# Patient Record
Sex: FEMALE | Race: WHITE | NOT HISPANIC OR LATINO | Employment: UNEMPLOYED | URBAN - METROPOLITAN AREA
[De-identification: names, ages, dates, MRNs, and addresses within clinical notes are randomized per-mention and may not be internally consistent; named-entity substitution may affect disease eponyms.]

---

## 2019-08-14 ENCOUNTER — TELEPHONE (OUTPATIENT)
Dept: VASCULAR SURGERY | Facility: CLINIC | Age: 34
End: 2019-08-14

## 2019-08-14 DIAGNOSIS — I83.93 VARICOSE VEINS OF BOTH LOWER EXTREMITIES, UNSPECIFIED WHETHER COMPLICATED: Primary | ICD-10-CM

## 2019-08-14 NOTE — TELEPHONE ENCOUNTER
----- Message from Mary Alice Russo sent at 8/13/2019  3:05 PM CDT -----  Regarding: External Patient Referral  Good Afternoon,    Dr. Helen Carpenter would like to refer the following patient to the Vascular Surgery department. The patients diagnosis is Varicose Veins of Lower extremity. I have scanned the patients referral and records into .     Please let me know if I can help schedule in any way.    Thank you,   Mary Alice Garcia

## 2019-11-20 ENCOUNTER — HOSPITAL ENCOUNTER (OUTPATIENT)
Facility: HOSPITAL | Age: 34
Discharge: HOME | End: 2019-11-22
Attending: EMERGENCY MEDICINE | Admitting: INTERNAL MEDICINE
Payer: MEDICAID

## 2019-11-20 DIAGNOSIS — R07.9 CHEST PAIN: ICD-10-CM

## 2019-11-20 DIAGNOSIS — J44.1 COPD WITH ACUTE EXACERBATION: Primary | ICD-10-CM

## 2019-11-20 LAB
ALBUMIN SERPL BCP-MCNC: 4 G/DL (ref 3.5–5.2)
ALP SERPL-CCNC: 72 U/L (ref 55–135)
ALT SERPL W/O P-5'-P-CCNC: 22 U/L (ref 10–44)
ANION GAP SERPL CALC-SCNC: 10 MMOL/L (ref 8–16)
AST SERPL-CCNC: 21 U/L (ref 10–40)
BASOPHILS # BLD AUTO: 0.01 K/UL (ref 0–0.2)
BASOPHILS NFR BLD: 0.2 % (ref 0–1.9)
BILIRUB SERPL-MCNC: 0.5 MG/DL (ref 0.1–1)
BNP SERPL-MCNC: 51 PG/ML (ref 0–99)
BUN SERPL-MCNC: 14 MG/DL (ref 6–20)
CALCIUM SERPL-MCNC: 9 MG/DL (ref 8.7–10.5)
CHLORIDE SERPL-SCNC: 106 MMOL/L (ref 95–110)
CO2 SERPL-SCNC: 25 MMOL/L (ref 23–29)
CREAT SERPL-MCNC: 0.9 MG/DL (ref 0.5–1.4)
DIFFERENTIAL METHOD: ABNORMAL
EOSINOPHIL # BLD AUTO: 0.1 K/UL (ref 0–0.5)
EOSINOPHIL NFR BLD: 2.5 % (ref 0–8)
ERYTHROCYTE [DISTWIDTH] IN BLOOD BY AUTOMATED COUNT: 13 % (ref 11.5–14.5)
EST. GFR  (AFRICAN AMERICAN): >60 ML/MIN/1.73 M^2
EST. GFR  (NON AFRICAN AMERICAN): >60 ML/MIN/1.73 M^2
GLUCOSE SERPL-MCNC: 94 MG/DL (ref 70–110)
HCT VFR BLD AUTO: 42.7 % (ref 37–48.5)
HGB BLD-MCNC: 14 G/DL (ref 12–16)
IMM GRANULOCYTES # BLD AUTO: 0.01 K/UL (ref 0–0.04)
IMM GRANULOCYTES NFR BLD AUTO: 0.2 % (ref 0–0.5)
LYMPHOCYTES # BLD AUTO: 1.1 K/UL (ref 1–4.8)
LYMPHOCYTES NFR BLD: 20.6 % (ref 18–48)
MAGNESIUM SERPL-MCNC: 1.9 MG/DL (ref 1.6–2.6)
MCH RBC QN AUTO: 31 PG (ref 27–31)
MCHC RBC AUTO-ENTMCNC: 32.8 G/DL (ref 32–36)
MCV RBC AUTO: 95 FL (ref 82–98)
MONOCYTES # BLD AUTO: 0.3 K/UL (ref 0.3–1)
MONOCYTES NFR BLD: 6.4 % (ref 4–15)
NEUTROPHILS # BLD AUTO: 3.6 K/UL (ref 1.8–7.7)
NEUTROPHILS NFR BLD: 70.1 % (ref 38–73)
NRBC BLD-RTO: 0 /100 WBC
PLATELET # BLD AUTO: 103 K/UL (ref 150–350)
PMV BLD AUTO: 12.1 FL (ref 9.2–12.9)
POTASSIUM SERPL-SCNC: 3.4 MMOL/L (ref 3.5–5.1)
PROT SERPL-MCNC: 7 G/DL (ref 6–8.4)
RBC # BLD AUTO: 4.52 M/UL (ref 4–5.4)
SODIUM SERPL-SCNC: 141 MMOL/L (ref 136–145)
TROPONIN I SERPL DL<=0.01 NG/ML-MCNC: <0.03 NG/ML (ref 0.02–0.04)
WBC # BLD AUTO: 5.14 K/UL (ref 3.9–12.7)

## 2019-11-20 PROCEDURE — 94640 AIRWAY INHALATION TREATMENT: CPT

## 2019-11-20 PROCEDURE — 99285 EMERGENCY DEPT VISIT HI MDM: CPT | Mod: 25

## 2019-11-20 PROCEDURE — 63600175 PHARM REV CODE 636 W HCPCS: Performed by: EMERGENCY MEDICINE

## 2019-11-20 PROCEDURE — 80053 COMPREHEN METABOLIC PANEL: CPT

## 2019-11-20 PROCEDURE — 85025 COMPLETE CBC W/AUTO DIFF WBC: CPT

## 2019-11-20 PROCEDURE — 83735 ASSAY OF MAGNESIUM: CPT

## 2019-11-20 PROCEDURE — 93005 ELECTROCARDIOGRAM TRACING: CPT

## 2019-11-20 PROCEDURE — 84484 ASSAY OF TROPONIN QUANT: CPT

## 2019-11-20 PROCEDURE — 96374 THER/PROPH/DIAG INJ IV PUSH: CPT

## 2019-11-20 PROCEDURE — 83880 ASSAY OF NATRIURETIC PEPTIDE: CPT

## 2019-11-20 PROCEDURE — 25000242 PHARM REV CODE 250 ALT 637 W/ HCPCS: Performed by: EMERGENCY MEDICINE

## 2019-11-20 RX ORDER — METHYLPREDNISOLONE SOD SUCC 125 MG
125 VIAL (EA) INJECTION
Status: COMPLETED | OUTPATIENT
Start: 2019-11-20 | End: 2019-11-20

## 2019-11-20 RX ORDER — IPRATROPIUM BROMIDE AND ALBUTEROL SULFATE 2.5; .5 MG/3ML; MG/3ML
3 SOLUTION RESPIRATORY (INHALATION)
Status: COMPLETED | OUTPATIENT
Start: 2019-11-20 | End: 2019-11-20

## 2019-11-20 RX ADMIN — IPRATROPIUM BROMIDE AND ALBUTEROL SULFATE 3 ML: .5; 3 SOLUTION RESPIRATORY (INHALATION) at 10:11

## 2019-11-20 RX ADMIN — IPRATROPIUM BROMIDE AND ALBUTEROL SULFATE 3 ML: .5; 3 SOLUTION RESPIRATORY (INHALATION) at 09:11

## 2019-11-20 RX ADMIN — METHYLPREDNISOLONE SODIUM SUCCINATE 125 MG: 125 INJECTION, POWDER, FOR SOLUTION INTRAMUSCULAR; INTRAVENOUS at 10:11

## 2019-11-21 PROBLEM — R07.9 CHEST PAIN: Status: ACTIVE | Noted: 2019-11-21

## 2019-11-21 PROBLEM — D68.51 FACTOR 5 LEIDEN MUTATION, HETEROZYGOUS: Status: ACTIVE | Noted: 2019-11-21

## 2019-11-21 PROBLEM — J44.1 COPD WITH ACUTE EXACERBATION: Status: ACTIVE | Noted: 2019-11-21

## 2019-11-21 PROBLEM — E87.6 HYPOKALEMIA: Status: ACTIVE | Noted: 2019-11-21

## 2019-11-21 LAB
ANION GAP SERPL CALC-SCNC: 8 MMOL/L (ref 8–16)
BASOPHILS # BLD AUTO: 0 K/UL (ref 0–0.2)
BASOPHILS NFR BLD: 0 % (ref 0–1.9)
BUN SERPL-MCNC: 11 MG/DL (ref 6–20)
CALCIUM SERPL-MCNC: 8.6 MG/DL (ref 8.7–10.5)
CHLORIDE SERPL-SCNC: 109 MMOL/L (ref 95–110)
CO2 SERPL-SCNC: 21 MMOL/L (ref 23–29)
CREAT SERPL-MCNC: 0.8 MG/DL (ref 0.5–1.4)
DIFFERENTIAL METHOD: ABNORMAL
EOSINOPHIL # BLD AUTO: 0 K/UL (ref 0–0.5)
EOSINOPHIL NFR BLD: 0.3 % (ref 0–8)
ERYTHROCYTE [DISTWIDTH] IN BLOOD BY AUTOMATED COUNT: 13 % (ref 11.5–14.5)
EST. GFR  (AFRICAN AMERICAN): >60 ML/MIN/1.73 M^2
EST. GFR  (NON AFRICAN AMERICAN): >60 ML/MIN/1.73 M^2
GLUCOSE SERPL-MCNC: 180 MG/DL (ref 70–110)
HCT VFR BLD AUTO: 40.5 % (ref 37–48.5)
HGB BLD-MCNC: 13.3 G/DL (ref 12–16)
IMM GRANULOCYTES # BLD AUTO: 0.01 K/UL (ref 0–0.04)
IMM GRANULOCYTES NFR BLD AUTO: 0.3 % (ref 0–0.5)
INR PPP: 2.8
LYMPHOCYTES # BLD AUTO: 0.4 K/UL (ref 1–4.8)
LYMPHOCYTES NFR BLD: 10.5 % (ref 18–48)
MCH RBC QN AUTO: 30.7 PG (ref 27–31)
MCHC RBC AUTO-ENTMCNC: 32.8 G/DL (ref 32–36)
MCV RBC AUTO: 94 FL (ref 82–98)
MONOCYTES # BLD AUTO: 0.1 K/UL (ref 0.3–1)
MONOCYTES NFR BLD: 1.7 % (ref 4–15)
NEUTROPHILS # BLD AUTO: 3 K/UL (ref 1.8–7.7)
NEUTROPHILS NFR BLD: 87.2 % (ref 38–73)
NRBC BLD-RTO: 0 /100 WBC
PLATELET # BLD AUTO: 93 K/UL (ref 150–350)
PMV BLD AUTO: 12.1 FL (ref 9.2–12.9)
POTASSIUM SERPL-SCNC: 3.7 MMOL/L (ref 3.5–5.1)
PROTHROMBIN TIME: 28.5 SEC (ref 10.6–14.8)
RBC # BLD AUTO: 4.33 M/UL (ref 4–5.4)
SODIUM SERPL-SCNC: 138 MMOL/L (ref 136–145)
TROPONIN I SERPL DL<=0.01 NG/ML-MCNC: <0.03 NG/ML (ref 0.02–0.04)
TROPONIN I SERPL DL<=0.01 NG/ML-MCNC: <0.03 NG/ML (ref 0.02–0.04)
WBC # BLD AUTO: 3.43 K/UL (ref 3.9–12.7)

## 2019-11-21 PROCEDURE — G0378 HOSPITAL OBSERVATION PER HR: HCPCS

## 2019-11-21 PROCEDURE — 25000003 PHARM REV CODE 250: Performed by: NURSE PRACTITIONER

## 2019-11-21 PROCEDURE — 85025 COMPLETE CBC W/AUTO DIFF WBC: CPT

## 2019-11-21 PROCEDURE — 63600175 PHARM REV CODE 636 W HCPCS: Performed by: INTERNAL MEDICINE

## 2019-11-21 PROCEDURE — 94640 AIRWAY INHALATION TREATMENT: CPT

## 2019-11-21 PROCEDURE — 96376 TX/PRO/DX INJ SAME DRUG ADON: CPT

## 2019-11-21 PROCEDURE — 25000242 PHARM REV CODE 250 ALT 637 W/ HCPCS: Performed by: NURSE PRACTITIONER

## 2019-11-21 PROCEDURE — 84484 ASSAY OF TROPONIN QUANT: CPT

## 2019-11-21 PROCEDURE — 36415 COLL VENOUS BLD VENIPUNCTURE: CPT

## 2019-11-21 PROCEDURE — 80048 BASIC METABOLIC PNL TOTAL CA: CPT

## 2019-11-21 PROCEDURE — 85610 PROTHROMBIN TIME: CPT

## 2019-11-21 PROCEDURE — 63600175 PHARM REV CODE 636 W HCPCS: Performed by: EMERGENCY MEDICINE

## 2019-11-21 PROCEDURE — 27000221 HC OXYGEN, UP TO 24 HOURS

## 2019-11-21 PROCEDURE — 25000003 PHARM REV CODE 250: Performed by: INTERNAL MEDICINE

## 2019-11-21 PROCEDURE — 63600175 PHARM REV CODE 636 W HCPCS: Performed by: NURSE PRACTITIONER

## 2019-11-21 PROCEDURE — 94761 N-INVAS EAR/PLS OXIMETRY MLT: CPT

## 2019-11-21 PROCEDURE — 96375 TX/PRO/DX INJ NEW DRUG ADDON: CPT

## 2019-11-21 RX ORDER — ALBUTEROL SULFATE 0.83 MG/ML
2.5 SOLUTION RESPIRATORY (INHALATION) EVERY 6 HOURS PRN
Status: ON HOLD | COMMUNITY
End: 2019-11-22 | Stop reason: HOSPADM

## 2019-11-21 RX ORDER — TIOTROPIUM BROMIDE 18 UG/1
18 CAPSULE ORAL; RESPIRATORY (INHALATION) DAILY
COMMUNITY

## 2019-11-21 RX ORDER — ACETAMINOPHEN 325 MG/1
650 TABLET ORAL EVERY 4 HOURS PRN
Status: DISCONTINUED | OUTPATIENT
Start: 2019-11-21 | End: 2019-11-22 | Stop reason: HOSPADM

## 2019-11-21 RX ORDER — POTASSIUM CHLORIDE 20 MEQ/15ML
40 SOLUTION ORAL
Status: DISCONTINUED | OUTPATIENT
Start: 2019-11-21 | End: 2019-11-22 | Stop reason: HOSPADM

## 2019-11-21 RX ORDER — ALBUTEROL SULFATE 0.83 MG/ML
5 SOLUTION RESPIRATORY (INHALATION)
Status: DISCONTINUED | OUTPATIENT
Start: 2019-11-21 | End: 2019-11-21

## 2019-11-21 RX ORDER — SODIUM,POTASSIUM PHOSPHATES 280-250MG
2 POWDER IN PACKET (EA) ORAL
Status: DISCONTINUED | OUTPATIENT
Start: 2019-11-21 | End: 2019-11-22 | Stop reason: HOSPADM

## 2019-11-21 RX ORDER — WARFARIN 4 MG/1
4 TABLET ORAL DAILY
COMMUNITY

## 2019-11-21 RX ORDER — MORPHINE SULFATE 2 MG/ML
2 INJECTION, SOLUTION INTRAMUSCULAR; INTRAVENOUS ONCE
Status: COMPLETED | OUTPATIENT
Start: 2019-11-21 | End: 2019-11-21

## 2019-11-21 RX ORDER — LANOLIN ALCOHOL/MO/W.PET/CERES
800 CREAM (GRAM) TOPICAL
Status: DISCONTINUED | OUTPATIENT
Start: 2019-11-21 | End: 2019-11-22 | Stop reason: HOSPADM

## 2019-11-21 RX ORDER — VARENICLINE TARTRATE 0.5 MG/1
0.5 TABLET, FILM COATED ORAL 2 TIMES DAILY
COMMUNITY

## 2019-11-21 RX ORDER — WARFARIN 1 MG/1
4 TABLET ORAL DAILY
Status: DISCONTINUED | OUTPATIENT
Start: 2019-11-21 | End: 2019-11-22 | Stop reason: HOSPADM

## 2019-11-21 RX ORDER — TIOTROPIUM BROMIDE 18 UG/1
18 CAPSULE ORAL; RESPIRATORY (INHALATION) DAILY
Status: DISCONTINUED | OUTPATIENT
Start: 2019-11-21 | End: 2019-11-22

## 2019-11-21 RX ORDER — IPRATROPIUM BROMIDE AND ALBUTEROL SULFATE 2.5; .5 MG/3ML; MG/3ML
3 SOLUTION RESPIRATORY (INHALATION) EVERY 6 HOURS
Status: DISCONTINUED | OUTPATIENT
Start: 2019-11-21 | End: 2019-11-22 | Stop reason: HOSPADM

## 2019-11-21 RX ORDER — SODIUM CHLORIDE 0.9 % (FLUSH) 0.9 %
10 SYRINGE (ML) INJECTION
Status: DISCONTINUED | OUTPATIENT
Start: 2019-11-21 | End: 2019-11-22 | Stop reason: HOSPADM

## 2019-11-21 RX ORDER — CLOPIDOGREL BISULFATE 75 MG/1
75 TABLET ORAL DAILY
COMMUNITY

## 2019-11-21 RX ORDER — ATORVASTATIN CALCIUM 40 MG/1
40 TABLET, FILM COATED ORAL DAILY
COMMUNITY

## 2019-11-21 RX ORDER — MAGNESIUM SULFATE 1 G/100ML
1 INJECTION INTRAVENOUS ONCE
Status: COMPLETED | OUTPATIENT
Start: 2019-11-21 | End: 2019-11-21

## 2019-11-21 RX ORDER — ATORVASTATIN CALCIUM 40 MG/1
40 TABLET, FILM COATED ORAL DAILY
Status: DISCONTINUED | OUTPATIENT
Start: 2019-11-21 | End: 2019-11-22 | Stop reason: HOSPADM

## 2019-11-21 RX ORDER — ALBUTEROL SULFATE 0.83 MG/ML
2.5 SOLUTION RESPIRATORY (INHALATION) EVERY 4 HOURS PRN
Status: DISCONTINUED | OUTPATIENT
Start: 2019-11-21 | End: 2019-11-22 | Stop reason: HOSPADM

## 2019-11-21 RX ORDER — BISACODYL 10 MG
10 SUPPOSITORY, RECTAL RECTAL DAILY PRN
Status: DISCONTINUED | OUTPATIENT
Start: 2019-11-21 | End: 2019-11-22 | Stop reason: HOSPADM

## 2019-11-21 RX ORDER — VARENICLINE TARTRATE 0.5 MG/1
0.5 TABLET, FILM COATED ORAL 2 TIMES DAILY
Status: DISCONTINUED | OUTPATIENT
Start: 2019-11-21 | End: 2019-11-22 | Stop reason: HOSPADM

## 2019-11-21 RX ORDER — CLOPIDOGREL BISULFATE 75 MG/1
75 TABLET ORAL DAILY
Status: DISCONTINUED | OUTPATIENT
Start: 2019-11-21 | End: 2019-11-22 | Stop reason: HOSPADM

## 2019-11-21 RX ORDER — GUAIFENESIN 100 MG/5ML
200 SOLUTION ORAL EVERY 4 HOURS PRN
Status: DISCONTINUED | OUTPATIENT
Start: 2019-11-21 | End: 2019-11-22 | Stop reason: HOSPADM

## 2019-11-21 RX ADMIN — AZITHROMYCIN MONOHYDRATE 500 MG: 500 INJECTION, POWDER, LYOPHILIZED, FOR SOLUTION INTRAVENOUS at 01:11

## 2019-11-21 RX ADMIN — VARENICLINE TARTRATE 0.5 MG: 0.5 TABLET, FILM COATED ORAL at 09:11

## 2019-11-21 RX ADMIN — IPRATROPIUM BROMIDE AND ALBUTEROL SULFATE 3 ML: .5; 3 SOLUTION RESPIRATORY (INHALATION) at 01:11

## 2019-11-21 RX ADMIN — METHYLPREDNISOLONE SODIUM SUCCINATE 80 MG: 40 INJECTION, POWDER, FOR SOLUTION INTRAMUSCULAR; INTRAVENOUS at 02:11

## 2019-11-21 RX ADMIN — IPRATROPIUM BROMIDE AND ALBUTEROL SULFATE 3 ML: .5; 3 SOLUTION RESPIRATORY (INHALATION) at 08:11

## 2019-11-21 RX ADMIN — IPRATROPIUM BROMIDE AND ALBUTEROL SULFATE 3 ML: .5; 3 SOLUTION RESPIRATORY (INHALATION) at 11:11

## 2019-11-21 RX ADMIN — VARENICLINE TARTRATE 0.5 MG: 0.5 TABLET, FILM COATED ORAL at 10:11

## 2019-11-21 RX ADMIN — ATORVASTATIN CALCIUM 40 MG: 40 TABLET, FILM COATED ORAL at 10:11

## 2019-11-21 RX ADMIN — MORPHINE SULFATE 2 MG: 2 INJECTION, SOLUTION INTRAMUSCULAR; INTRAVENOUS at 07:11

## 2019-11-21 RX ADMIN — MAGNESIUM SULFATE IN DEXTROSE 1 G: 10 INJECTION, SOLUTION INTRAVENOUS at 01:11

## 2019-11-21 RX ADMIN — METHYLPREDNISOLONE SODIUM SUCCINATE 80 MG: 40 INJECTION, POWDER, FOR SOLUTION INTRAMUSCULAR; INTRAVENOUS at 10:11

## 2019-11-21 RX ADMIN — IPRATROPIUM BROMIDE AND ALBUTEROL SULFATE 3 ML: .5; 3 SOLUTION RESPIRATORY (INHALATION) at 06:11

## 2019-11-21 RX ADMIN — METHYLPREDNISOLONE SODIUM SUCCINATE 80 MG: 40 INJECTION, POWDER, FOR SOLUTION INTRAMUSCULAR; INTRAVENOUS at 05:11

## 2019-11-21 RX ADMIN — CLOPIDOGREL BISULFATE 75 MG: 75 TABLET, FILM COATED ORAL at 10:11

## 2019-11-21 RX ADMIN — WARFARIN SODIUM 4 MG: 1 TABLET ORAL at 05:11

## 2019-11-21 RX ADMIN — GUAIFENESIN 200 MG: 200 SOLUTION ORAL at 03:11

## 2019-11-21 NOTE — ASSESSMENT & PLAN NOTE
Likely due to cough/respiratory problems  Given history of CAD, will continue cardiac monitor for arrhythmia and trend troponin  Continue clopidogrel

## 2019-11-21 NOTE — PLAN OF CARE
11/21/19 0833   Patient Assessment/Suction   Level of Consciousness (AVPU) alert   Respiratory Effort Unlabored;Normal   Expansion/Accessory Muscles/Retractions no use of accessory muscles;no retractions;expansion symmetric   All Lung Fields Breath Sounds wheezes, expiratory   Rhythm/Pattern, Respiratory pattern regular;unlabored;depth regular   Cough Frequency infrequent   Cough Type good   PRE-TX-O2   O2 Device (Oxygen Therapy) nasal cannula   $ Is the patient on Low Flow Oxygen? Yes   Flow (L/min) 3   SpO2 96 %   Pulse Oximetry Type Intermittent   $ Pulse Oximetry - Multiple Charge Pulse Oximetry - Multiple   Pulse 100   Resp 18   Aerosol Therapy   $ Aerosol Therapy Charges Aerosol Treatment   Daily Review of Necessity (SVN) completed   Respiratory Treatment Status (SVN) given   Treatment Route (SVN) mask   Patient Position (SVN) semi-Gonsalez's   Post Treatment Assessment (SVN) increased aeration   Signs of Intolerance (SVN) none   Breath Sounds Post-Respiratory Treatment   Throughout All Fields Post-Treatment All Fields   Throughout All Fields Post-Treatment aeration increased   Post-treatment Heart Rate (beats/min) 101   Post-treatment Resp Rate (breaths/min) 17

## 2019-11-21 NOTE — SUBJECTIVE & OBJECTIVE
Past Medical History:   Diagnosis Date    COPD (chronic obstructive pulmonary disease)     Coronary artery disease        History reviewed. No pertinent surgical history.    Review of patient's allergies indicates:  No Known Allergies    No current facility-administered medications on file prior to encounter.      Current Outpatient Medications on File Prior to Encounter   Medication Sig    albuterol (PROVENTIL) 2.5 mg /3 mL (0.083 %) nebulizer solution Take 2.5 mg by nebulization every 6 (six) hours as needed for Wheezing. Rescue    atorvastatin (LIPITOR) 40 MG tablet Take 40 mg by mouth once daily.    clopidogrel (PLAVIX) 75 mg tablet Take 75 mg by mouth once daily.    tiotropium (SPIRIVA) 18 mcg inhalation capsule Inhale 18 mcg into the lungs once daily. Controller    varenicline (CHANTIX) 0.5 MG Tab Take 0.5 mg by mouth 2 (two) times daily.    warfarin (COUMADIN) 4 MG tablet Take 4 mg by mouth once daily.     Family History     Problem Relation (Age of Onset)    Cancer Mother    No Known Problems Father        Tobacco Use    Smoking status: Current Some Day Smoker   Substance and Sexual Activity    Alcohol use: Not Currently    Drug use: Never    Sexual activity: Not on file     Review of Systems   All other systems reviewed and are negative.    Objective:     Vital Signs (Most Recent):  Temp: 98.1 °F (36.7 °C) (11/20/19 2139)  Pulse: (!) 114 (11/21/19 0000)  Resp: (!) 28 (11/21/19 0000)  BP: 117/66 (11/21/19 0000)  SpO2: (!) 93 % (11/21/19 0000) Vital Signs (24h Range):  Temp:  [98.1 °F (36.7 °C)] 98.1 °F (36.7 °C)  Pulse:  [103-114] 114  Resp:  [16-40] 28  SpO2:  [93 %-99 %] 93 %  BP: (117-154)/(66-93) 117/66     Weight: 83 kg (183 lb)  Body mass index is 30.45 kg/m².    Physical Exam   Constitutional: She is oriented to person, place, and time. She appears well-nourished.   HENT:   Head: Normocephalic.   Eyes: EOM are normal. Right eye exhibits no discharge. Left eye exhibits no discharge.   Neck:  Normal range of motion. Neck supple. No JVD present.   Cardiovascular: Regular rhythm and normal heart sounds.   Tachycardic   Pulmonary/Chest:   Tachypneic, expiratory wheezes bilaterally   Abdominal: Soft. Bowel sounds are normal.   Genitourinary: Vagina normal.   Musculoskeletal: She exhibits no edema, tenderness or deformity.   Neurological: She is alert and oriented to person, place, and time.   Skin: Skin is warm and dry. Capillary refill takes less than 2 seconds.   Vitals reviewed.        CRANIAL NERVES     CN III, IV, VI   Extraocular motions are normal.        Significant Labs:   CBC:   Recent Labs   Lab 11/20/19 2154   WBC 5.14   HGB 14.0   HCT 42.7   *     CMP:   Recent Labs   Lab 11/20/19 2154      K 3.4*      CO2 25   GLU 94   BUN 14   CREATININE 0.9   CALCIUM 9.0   PROT 7.0   ALBUMIN 4.0   BILITOT 0.5   ALKPHOS 72   AST 21   ALT 22   ANIONGAP 10   EGFRNONAA >60.0     Cardiac Markers:   Recent Labs   Lab 11/20/19 2154   BNP 51     Troponin:   Recent Labs   Lab 11/20/19 2154   TROPONINI <0.030     All pertinent labs within the past 24 hours have been reviewed.    Significant Imaging:   CXR, personally reviewed, no obvious infiltrates or overt heart failure  EKG, personally reviewed, sinus tachycardia, rate 105, no ST elevation

## 2019-11-21 NOTE — HPI
The patient is a 34-year-old female with a history of factor 5 Leiden- on anticoagulation therapy with warfarin, CAD (status post PCI of mid LAD in  2016 and mid RCA in 2018), COPD, and hyperlipidemia presented to the  emergency department with shortness of breath. She states that she has been having progressive and severe shortness of breath since yesterday morning despite using her inhalers. Shortness of breath is worsen with activities and frequent nonproductive cough. She denies fever or chills. She also reports mid chest tightness with deep breathing.    She reports that she quit smoking about 5 months ago but had resumed smoking about 1.5 weeks ago. She reports recent weight lost and some chronic ankle/leg swelling.    Per ED physician, she was noted to be tachycardic and tachypneic.  She received 3 rounds of DuoNeb treatments and continuous albuterol nebulizer treatment.  Workup in the ED was unremarkable.  Troponin was negative.  She continues to report shortness of breath.  Oxygen saturation is currently 92-94%.

## 2019-11-21 NOTE — PLAN OF CARE
11/21/19 0519   Patient Assessment/Suction   Level of Consciousness (AVPU) alert   Respiratory Effort Mild;Labored   Expansion/Accessory Muscles/Retractions no use of accessory muscles   All Lung Fields Breath Sounds equal bilaterally;diminished   Rhythm/Pattern, Respiratory shallow;shortness of breath   PRE-TX-O2   O2 Device (Oxygen Therapy) nasal cannula   Flow (L/min) 3   SpO2 96 %   Pulse 88   Resp (!) 24   Aerosol Therapy   $ Aerosol Therapy Charges Aerosol Treatment   Daily Review of Necessity (SVN) completed   Respiratory Treatment Status (SVN) given   Treatment Route (SVN) mask   Patient Position (SVN) sitting on edge of bed   Post Treatment Assessment (SVN) breath sounds unchanged   Signs of Intolerance (SVN) none   Breath Sounds Post-Respiratory Treatment   Post-treatment Heart Rate (beats/min) 101   Post-treatment Resp Rate (breaths/min) 20   Equipment Change   $ RT Equipment Treatment nebuilzer;Aerosol treatment mask

## 2019-11-21 NOTE — ED NOTES
No distress noted / hospitalist at side / admission in progress / pt aware and voices understanding

## 2019-11-21 NOTE — ED NOTES
Rounding on the patient has been done. she has been updated on the plan of care and her current status. Comfort positioning and restroom needs were addressed. Necessary items were placed with in her reach and she was advised when a reassessment would take place. The call bell remains at the bedside for any additional patient needs. The patient is resting comfortably on the stretcher, respirations are even and unlabored, skin warm and dry. Will continue to monitor.

## 2019-11-21 NOTE — H&P
[facility] Medicine  History & Physical  Date of service: 11/21/2019  At 0045    Patient Name: Christine Segovia  MRN: [id number]  Admission Date: 11/20/2019  Attending Physician: Clarice Hodgson MD   Primary Care Provider: Helen Angel MD         Patient information was obtained from patient and ER records.     Subjective:     Principal Problem:COPD with acute exacerbation    Chief Complaint:   Chief Complaint   Patient presents with    Shortness of Breath        HPI: The patient is a 34-year-old female with a history of factor 5 Leiden- on anticoagulation therapy with warfarin, CAD (status post PCI of mid LAD in  2016 and mid RCA in 2018), COPD, and hyperlipidemia presented to the  emergency department with shortness of breath. She states that she has been having progressive and severe shortness of breath since yesterday morning despite using her inhalers. Shortness of breath is worsen with activities and frequent nonproductive cough. She denies fever or chills. She also reports mid chest tightness with deep breathing.    She reports that she quit smoking about 5 months ago but had resumed smoking about 1.5 weeks ago. She reports recent weight lost and some chronic ankle/leg swelling.    Per ED physician, she was noted to be tachycardic and tachypneic.  She received 3 rounds of DuoNeb treatments and continuous albuterol nebulizer treatment.  Workup in the ED was unremarkable.  Troponin was negative.  She continues to report shortness of breath.  Oxygen saturation is currently 92-94%.          Past Medical History:   Diagnosis Date    COPD (chronic obstructive pulmonary disease)     Coronary artery disease        History reviewed. No pertinent surgical history.    Review of patient's allergies indicates:  No Known Allergies    No current facility-administered medications on file prior to encounter.      Current Outpatient Medications on File Prior to Encounter   Medication Sig     albuterol (PROVENTIL) 2.5 mg /3 mL (0.083 %) nebulizer solution Take 2.5 mg by nebulization every 6 (six) hours as needed for Wheezing. Rescue    atorvastatin (LIPITOR) 40 MG tablet Take 40 mg by mouth once daily.    clopidogrel (PLAVIX) 75 mg tablet Take 75 mg by mouth once daily.    tiotropium (SPIRIVA) 18 mcg inhalation capsule Inhale 18 mcg into the lungs once daily. Controller    varenicline (CHANTIX) 0.5 MG Tab Take 0.5 mg by mouth 2 (two) times daily.    warfarin (COUMADIN) 4 MG tablet Take 4 mg by mouth once daily.     Family History     Problem Relation (Age of Onset)    Cancer Mother    No Known Problems Father        Tobacco Use    Smoking status: Current Some Day Smoker   Substance and Sexual Activity    Alcohol use: Not Currently    Drug use: Never    Sexual activity: Not on file     Review of Systems   All other systems reviewed and are negative.    Objective:     Vital Signs (Most Recent):  Temp: 98.1 °F (36.7 °C) (11/20/19 2139)  Pulse: (!) 114 (11/21/19 0000)  Resp: (!) 28 (11/21/19 0000)  BP: 117/66 (11/21/19 0000)  SpO2: (!) 93 % (11/21/19 0000) Vital Signs (24h Range):  Temp:  [98.1 °F (36.7 °C)] 98.1 °F (36.7 °C)  Pulse:  [103-114] 114  Resp:  [16-40] 28  SpO2:  [93 %-99 %] 93 %  BP: (117-154)/(66-93) 117/66     Weight: 83 kg (183 lb)  Body mass index is 30.45 kg/m².    Physical Exam   Constitutional: She is oriented to person, place, and time. She appears well-nourished.   HENT:   Head: Normocephalic.   Eyes: EOM are normal. Right eye exhibits no discharge. Left eye exhibits no discharge.   Neck: Normal range of motion. Neck supple. No JVD present.   Cardiovascular: Regular rhythm and normal heart sounds.   Tachycardic   Pulmonary/Chest:   Tachypneic, expiratory wheezes bilaterally   Abdominal: Soft. Bowel sounds are normal.   Genitourinary: Vagina normal.   Musculoskeletal: She exhibits no edema, tenderness or deformity.   Neurological: She is alert and oriented to person, place, and  time.   Skin: Skin is warm and dry. Capillary refill takes less than 2 seconds.   Vitals reviewed.        CRANIAL NERVES     CN III, IV, VI   Extraocular motions are normal.        Significant Labs:   CBC:   Recent Labs   Lab 11/20/19 2154   WBC 5.14   HGB 14.0   HCT 42.7   *     CMP:   Recent Labs   Lab 11/20/19 2154      K 3.4*      CO2 25   GLU 94   BUN 14   CREATININE 0.9   CALCIUM 9.0   PROT 7.0   ALBUMIN 4.0   BILITOT 0.5   ALKPHOS 72   AST 21   ALT 22   ANIONGAP 10   EGFRNONAA >60.0     Cardiac Markers:   Recent Labs   Lab 11/20/19 2154   BNP 51     Troponin:   Recent Labs   Lab 11/20/19 2154   TROPONINI <0.030     All pertinent labs within the past 24 hours have been reviewed.    Significant Imaging:   CXR, personally reviewed, no obvious infiltrates or overt heart failure  EKG, personally reviewed, sinus tachycardia, rate 105, no ST elevation    Assessment/Plan:     * COPD with acute exacerbation  Monitor pulse oximetry, O2 as needed  Inhalers/nebulizers  Tobacco cessation advised  ABX with azithromycin  IV Steroids        Chest pain  Likely due to cough/respiratory problems  Given history of CAD, will continue cardiac monitor for arrhythmia and trend troponin  Continue clopidogrel    Factor 5 Leiden mutation, heterozygous  Continue anticoagulation therapy  Therapeutic INR  Monitor PT/INR      Hypokalemia  Replete  Monitor      VTE Risk Mitigation (From admission, onward)         Ordered     warfarin (COUMADIN) tablet 4 mg  Daily      11/21/19 0101                   MARTIR Orosco  Department of Hospital Medicine   [facility]

## 2019-11-21 NOTE — ASSESSMENT & PLAN NOTE
Monitor pulse oximetry, O2 as needed  Inhalers/nebulizers  Tobacco cessation advised  ABX with azithromycin  IV Steroids

## 2019-11-22 VITALS
SYSTOLIC BLOOD PRESSURE: 119 MMHG | HEIGHT: 65 IN | HEART RATE: 100 BPM | WEIGHT: 184.44 LBS | OXYGEN SATURATION: 96 % | TEMPERATURE: 98 F | RESPIRATION RATE: 18 BRPM | DIASTOLIC BLOOD PRESSURE: 68 MMHG | BODY MASS INDEX: 30.73 KG/M2

## 2019-11-22 LAB
ANION GAP SERPL CALC-SCNC: 8 MMOL/L (ref 8–16)
BUN SERPL-MCNC: 14 MG/DL (ref 6–20)
CALCIUM SERPL-MCNC: 9.2 MG/DL (ref 8.7–10.5)
CHLORIDE SERPL-SCNC: 108 MMOL/L (ref 95–110)
CO2 SERPL-SCNC: 25 MMOL/L (ref 23–29)
CREAT SERPL-MCNC: 0.9 MG/DL (ref 0.5–1.4)
EST. GFR  (AFRICAN AMERICAN): >60 ML/MIN/1.73 M^2
EST. GFR  (NON AFRICAN AMERICAN): >60 ML/MIN/1.73 M^2
GLUCOSE SERPL-MCNC: 189 MG/DL (ref 70–110)
INR PPP: 3.6
POTASSIUM SERPL-SCNC: 4.1 MMOL/L (ref 3.5–5.1)
PROTHROMBIN TIME: 34.5 SEC (ref 10.6–14.8)
SODIUM SERPL-SCNC: 141 MMOL/L (ref 136–145)

## 2019-11-22 PROCEDURE — 80048 BASIC METABOLIC PNL TOTAL CA: CPT

## 2019-11-22 PROCEDURE — 94761 N-INVAS EAR/PLS OXIMETRY MLT: CPT

## 2019-11-22 PROCEDURE — G0378 HOSPITAL OBSERVATION PER HR: HCPCS

## 2019-11-22 PROCEDURE — 63600175 PHARM REV CODE 636 W HCPCS: Performed by: NURSE PRACTITIONER

## 2019-11-22 PROCEDURE — 96376 TX/PRO/DX INJ SAME DRUG ADON: CPT

## 2019-11-22 PROCEDURE — 25000003 PHARM REV CODE 250: Performed by: NURSE PRACTITIONER

## 2019-11-22 PROCEDURE — 99900035 HC TECH TIME PER 15 MIN (STAT)

## 2019-11-22 PROCEDURE — 94640 AIRWAY INHALATION TREATMENT: CPT

## 2019-11-22 PROCEDURE — 25000242 PHARM REV CODE 250 ALT 637 W/ HCPCS: Performed by: NURSE PRACTITIONER

## 2019-11-22 PROCEDURE — 36415 COLL VENOUS BLD VENIPUNCTURE: CPT

## 2019-11-22 PROCEDURE — 27000221 HC OXYGEN, UP TO 24 HOURS

## 2019-11-22 PROCEDURE — 85610 PROTHROMBIN TIME: CPT

## 2019-11-22 RX ORDER — GUAIFENESIN 100 MG/5ML
200 SOLUTION ORAL EVERY 4 HOURS PRN
Qty: 100 ML | Refills: 0 | Status: SHIPPED | OUTPATIENT
Start: 2019-11-22 | End: 2019-12-02

## 2019-11-22 RX ORDER — ALBUTEROL SULFATE 0.63 MG/3ML
0.63 SOLUTION RESPIRATORY (INHALATION) EVERY 6 HOURS PRN
Qty: 1 BOX | Refills: 0 | Status: SHIPPED | OUTPATIENT
Start: 2019-11-22 | End: 2020-11-21

## 2019-11-22 RX ORDER — PREDNISONE 10 MG/1
TABLET ORAL
Qty: 63 TABLET | Refills: 0 | Status: SHIPPED | OUTPATIENT
Start: 2019-11-22 | End: 2019-12-07

## 2019-11-22 RX ADMIN — VARENICLINE TARTRATE 0.5 MG: 0.5 TABLET, FILM COATED ORAL at 08:11

## 2019-11-22 RX ADMIN — ALBUTEROL SULFATE 2.5 MG: 2.5 SOLUTION RESPIRATORY (INHALATION) at 04:11

## 2019-11-22 RX ADMIN — IPRATROPIUM BROMIDE AND ALBUTEROL SULFATE 3 ML: .5; 3 SOLUTION RESPIRATORY (INHALATION) at 08:11

## 2019-11-22 RX ADMIN — AZITHROMYCIN MONOHYDRATE 500 MG: 500 INJECTION, POWDER, LYOPHILIZED, FOR SOLUTION INTRAVENOUS at 02:11

## 2019-11-22 RX ADMIN — METHYLPREDNISOLONE SODIUM SUCCINATE 80 MG: 40 INJECTION, POWDER, FOR SOLUTION INTRAMUSCULAR; INTRAVENOUS at 06:11

## 2019-11-22 NOTE — DISCHARGE SUMMARY
formerly [facility] Medicine  Discharge Summary      Patient Name: Christine Segovia  MRN: [id number]  Admission Date: 11/20/2019  Hospital Length of Stay: 1 days  Discharge Date and Time:  11/22/2019 10:26 AM  Attending Physician: Clarice Hodgson MD   Discharging Provider: Colten Laurent MD  Primary Care Provider: Helen Angel MD      HPI:   The patient is a 34-year-old female with a history of factor 5 Leiden- on anticoagulation therapy with warfarin, CAD (status post PCI of mid LAD in  2016 and mid RCA in 2018), COPD, and hyperlipidemia presented to the  emergency department with shortness of breath. She states that she has been having progressive and severe shortness of breath since yesterday morning despite using her inhalers. Shortness of breath is worsen with activities and frequent nonproductive cough. She denies fever or chills. She also reports mid chest tightness with deep breathing.    She reports that she quit smoking about 5 months ago but had resumed smoking about 1.5 weeks ago. She reports recent weight lost and some chronic ankle/leg swelling.    Per ED physician, she was noted to be tachycardic and tachypneic.  She received 3 rounds of DuoNeb treatments and continuous albuterol nebulizer treatment.  Workup in the ED was unremarkable.  Troponin was negative.  She continues to report shortness of breath.  Oxygen saturation is currently 92-94%.              Hospital Course:   Patient feels improved on day of discharge and wants to go home because her  is unable to watch her two young children who are 10 and 9. Patient is prescribed a tapering dose of steroids (prolonged taper of 12 days) no antibiotics necessary. Tiotropium daily is continued from home medication and albuterol inhaler as needed.   Vitals:    11/22/19 0823   BP:    Pulse: 100   Resp: 18   Temp:      GEN: ao nad  HEENT: ncat  LUNGS: clear, no wheezing, diminished at bases and peripherally  CAR: nl s1s2  ABD:  soft ntnd  EXT: rom grossly intact  SKIN: warm+dry  NEURO: cn 2-12 grossly intact    Consults:   Consults (From admission, onward)        Status Ordering Provider     Inpatient consult to Hospitalist  Once     Provider:  MARTIR Nuñez ELIZABETH D.          No new Assessment & Plan notes have been filed under this hospital service since the last note was generated.  Service: Hospital Medicine    Final Active Diagnoses:    Diagnosis Date Noted POA    PRINCIPAL PROBLEM:  COPD with acute exacerbation [J44.1] 11/21/2019 Yes    Chest pain [R07.9] 11/21/2019 Yes    Factor 5 Leiden mutation, heterozygous [D68.51] 11/21/2019 Yes    Hypokalemia [E87.6] 11/21/2019 Yes      Problems Resolved During this Admission:       Discharged Condition: fair    Disposition: Home or Self Care    Follow Up:  Follow-up Information     Schedule an appointment as soon as possible for a visit with Helen Angel MD.    Specialty:  Internal Medicine  Why:  As needed  Contact information:  [address]  [address] [place] [address]  [phone]                 Patient Instructions:      Diet Cardiac     Notify your health care provider if you experience any of the following:  temperature >100.4     Notify your health care provider if you experience any of the following:  difficulty breathing or increased cough     Notify your health care provider if you experience any of the following:  severe persistent headache     Notify your health care provider if you experience any of the following:  persistent dizziness, light-headedness, or visual disturbances     Notify your health care provider if you experience any of the following:  increased confusion or weakness     Activity as tolerated       Significant Diagnostic Studies: Labs: All labs within the past 24 hours have been reviewed    Pending Diagnostic Studies:     None         Medications:  Reconciled Home Medications:      Medication List       START taking these medications    albuterol 0.63 mg/3 mL Nebu  Commonly known as:  ACCUNEB  Take 3 mLs (0.63 mg total) by nebulization every 6 (six) hours as needed. Rescue  Replaces:  albuterol 2.5 mg /3 mL (0.083 %) nebulizer solution     guaifenesin 100 mg/5 ml 100 mg/5 mL syrup  Commonly known as:  ROBITUSSIN  Take 10 mLs (200 mg total) by mouth every 4 (four) hours as needed for Congestion.     predniSONE 10 mg tablet pack  Commonly known as:  DELTASONE  Take 4 tablets (40 mg total) by mouth 2 (two) times daily for 3 days, THEN 3 tablets (30 mg total) 2 (two) times daily for 3 days, THEN 2 tablets (20 mg total) 2 (two) times daily for 3 days, THEN 1 tablet (10 mg total) 2 (two) times daily for 3 days, THEN 1 tablet (10 mg total) once daily for 3 days.  Start taking on:  November 22, 2019        CONTINUE taking these medications    atorvastatin 40 MG tablet  Commonly known as:  LIPITOR  Take 40 mg by mouth once daily.     clopidogrel 75 mg tablet  Commonly known as:  PLAVIX  Take 75 mg by mouth once daily.     tiotropium 18 mcg inhalation capsule  Commonly known as:  SPIRIVA  Inhale 18 mcg into the lungs once daily. Controller     varenicline 0.5 MG Tab  Commonly known as:  CHANTIX  Take 0.5 mg by mouth 2 (two) times daily.     warfarin 4 MG tablet  Commonly known as:  COUMADIN  Take 4 mg by mouth once daily.        STOP taking these medications    albuterol 2.5 mg /3 mL (0.083 %) nebulizer solution  Commonly known as:  PROVENTIL  Replaced by:  albuterol 0.63 mg/3 mL Nebu            Indwelling Lines/Drains at time of discharge:   Lines/Drains/Airways     None                 Time spent on the discharge of patient: 16 minutes  Patient was seen and examined on the date of discharge and determined to be suitable for discharge.         Colten Laurent MD  Department of Hospital Medicine  Formerly [facility] later [facility]

## 2019-11-22 NOTE — PROGRESS NOTES
Cardiac Rehab     Christine Segovia   [id number]   11/22/2019         Cardiac Rehab Phase Taught: Phase 1    Teaching Method: Verbal    Handouts: Smoking Cessation    Educational Videos: None    Understanding:  Knowledge indicated by feedback and Verbalize understanding    Comments: Education on Copd and smoking cessation provided. Questions answered.    Total Time Spent: 15 mins            Jennyfer Lynch RN

## 2019-11-22 NOTE — PLAN OF CARE
11/22/19 0823   Patient Assessment/Suction   Level of Consciousness (AVPU) alert   Respiratory Effort Normal;Unlabored   Expansion/Accessory Muscles/Retractions no retractions;no use of accessory muscles   All Lung Fields Breath Sounds coarse   PRE-TX-O2   O2 Device (Oxygen Therapy) room air   $ Is the patient on Low Flow Oxygen? Yes  (O2 on SB)   SpO2 96 %   Pulse Oximetry Type Intermittent   $ Pulse Oximetry - Multiple Charge Pulse Oximetry - Multiple   Pulse 100   Resp 18   Aerosol Therapy   $ Aerosol Therapy Charges Aerosol Treatment   Daily Review of Necessity (SVN) completed   Respiratory Treatment Status (SVN) given   Treatment Route (SVN) mask   Patient Position (SVN) HOB elevated   Post Treatment Assessment (SVN) increased aeration;patient reports breathing improved   Signs of Intolerance (SVN) none   Inhaler   $ Inhaler Charges Refused   Breath Sounds Post-Respiratory Treatment   Post-treatment Heart Rate (beats/min) 90   Post-treatment Resp Rate (breaths/min) 18

## 2019-11-22 NOTE — PLAN OF CARE
11/21/19 1856   Patient Assessment/Suction   Level of Consciousness (AVPU) alert   Respiratory Effort Normal;Unlabored   Expansion/Accessory Muscles/Retractions no use of accessory muscles   All Lung Fields Breath Sounds wheezes, expiratory   PRE-TX-O2   O2 Device (Oxygen Therapy) nasal cannula   Flow (L/min) 2   SpO2 96 %   Pulse 107   Resp 18   Aerosol Therapy   $ Aerosol Therapy Charges Aerosol Treatment   Respiratory Treatment Status (SVN) given   Treatment Route (SVN) mask   Patient Position (SVN) semi-Gonsalez's   Post Treatment Assessment (SVN) breath sounds improved   Signs of Intolerance (SVN) none   Breath Sounds Post-Respiratory Treatment   Throughout All Fields Post-Treatment All Fields   Throughout All Fields Post-Treatment aeration increased   Post-treatment Heart Rate (beats/min) 103   Post-treatment Resp Rate (breaths/min) 16

## 2019-11-22 NOTE — PLAN OF CARE
11/22/19 1055   Final Note   Assessment Type Final Discharge Note   Anticipated Discharge Disposition Home

## 2021-02-15 NOTE — ED PROVIDER NOTES
Encounter Date: 11/20/2019       History     Chief Complaint   Patient presents with    Shortness of Breath     34-year-old female with a history of factor 5 Leiden, CAD (status post stenting in 2016, 2018), COPD presents emergency department with shortness of breath.  The patient states that she had previously quit smoking for 5 months.  She began smoking almost a week ago.  She states that over the past day she has had worsening shortness of breath with a nonproductive cough.  She has attempted to use her inhaler multiple times today with progressively worsening shortness of breath.  The patient denies any fevers or chills. She states that she has chest tightness that is consistent with her prior episodes of COPD exacerbations.  She denies any marichuy chest pain.  She has never been intubated.        Review of patient's allergies indicates:  No Known Allergies  No past medical history on file.  No past surgical history on file.  No family history on file.  Social History     Tobacco Use    Smoking status: Not on file   Substance Use Topics    Alcohol use: Not on file    Drug use: Not on file     Review of Systems   Constitutional: Negative for chills, diaphoresis, fatigue and fever.   HENT: Negative for congestion.    Eyes: Negative for visual disturbance.   Respiratory: Positive for cough, shortness of breath and wheezing. Negative for stridor.    Cardiovascular: Negative for chest pain, palpitations and leg swelling.   Gastrointestinal: Negative for abdominal distention, diarrhea, nausea and vomiting.   Genitourinary: Negative for dysuria, frequency, hematuria and urgency.   Musculoskeletal: Negative for back pain.   Skin: Negative for pallor.   Neurological: Negative for weakness, light-headedness, numbness and headaches.   Psychiatric/Behavioral: Negative for confusion.   All other systems reviewed and are negative.      Physical Exam     Initial Vitals [11/20/19 2139]   BP Pulse Resp Temp SpO2   138/80 103  Maureen Nguyen RN  Outcome: Not Progressing (!) 40 98.1 °F (36.7 °C) (!) 94 %      MAP       --         Physical Exam    Nursing note and vitals reviewed.  Constitutional: She appears well-developed and well-nourished. She appears distressed.   HENT:   Head: Normocephalic and atraumatic.   Eyes: EOM are normal. Pupils are equal, round, and reactive to light.   Neck: Normal range of motion. Neck supple.   Cardiovascular: Normal rate, regular rhythm, normal heart sounds and intact distal pulses.   Pulmonary/Chest: She is in respiratory distress. She has wheezes (Severe inspiratory and expiratory wheezes with decreased air entry). She has no rhonchi. She has no rales.   Increased work of breathing with accessory muscle use   Abdominal: Soft. Bowel sounds are normal. She exhibits no distension. There is no tenderness. There is no rebound.   Musculoskeletal: Normal range of motion.   Neurological: She is alert and oriented to person, place, and time. She has normal strength. No sensory deficit. GCS score is 15. GCS eye subscore is 4. GCS verbal subscore is 5. GCS motor subscore is 6.   Skin: Skin is warm and dry. Capillary refill takes less than 2 seconds.   Psychiatric: Thought content normal.         ED Course   Procedures  Labs Reviewed   CBC W/ AUTO DIFFERENTIAL - Abnormal; Notable for the following components:       Result Value    Platelets 103 (*)     All other components within normal limits   COMPREHENSIVE METABOLIC PANEL - Abnormal; Notable for the following components:    Potassium 3.4 (*)     All other components within normal limits   TROPONIN I   B-TYPE NATRIURETIC PEPTIDE   MAGNESIUM     EKG Readings: (Independently Interpreted)   Initial Reading: No STEMI.   Sinus tachycardia 102 beats per minute with no ST elevations or depressions, normal intervals       Imaging Results          X-Ray Chest AP Portable (In process)               X-Rays:   Independently Interpreted Readings:   Chest X-Ray: No acute abnormalities.     Medical Decision Making:   ED  Management:  34-year-old female presents the emergency department with shortness of breath.  Differential includes COPD exacerbation, pneumothorax, pneumonia, PE, pericardial effusion, ACS, CHF.  The patient was treated with aggressive nebulizer treatments, Solu-Medrol and magnesium.  She remains with tachypnea and expiratory wheezes. She is oxygen saturations in the low 90s.  Her labs are grossly unremarkable.  EKG is nonischemic and chest x-ray without focal consolidation.  The patient will be admitted for serial nebulizer treatments.  She has also been covered with azithromycin given her worsening cough.    Corazon Delatorre MD  Emergency Medicine  11/21/2019 12:09 AM                                   Clinical Impression:       ICD-10-CM ICD-9-CM   1. Chest pain R07.9 786.50                             Corazon Delatorre MD  11/21/19 0009

## 2021-12-20 DIAGNOSIS — R07.81 PLEURODYNIA: Primary | ICD-10-CM

## 2022-04-05 ENCOUNTER — HOSPITAL ENCOUNTER (OUTPATIENT)
Dept: RADIOLOGY | Facility: HOSPITAL | Age: 37
Discharge: HOME | End: 2022-04-05
Attending: INTERNAL MEDICINE
Payer: MEDICAID

## 2022-04-05 DIAGNOSIS — R07.81 PLEURODYNIA: ICD-10-CM

## 2022-04-05 PROCEDURE — 71046 X-RAY EXAM CHEST 2 VIEWS: CPT | Mod: TC,PO

## 2022-04-05 PROCEDURE — 71110 X-RAY EXAM RIBS BIL 3 VIEWS: CPT | Mod: TC,PO

## 2022-06-01 ENCOUNTER — TELEPHONE (OUTPATIENT)
Dept: PULMONOLOGY | Facility: CLINIC | Age: 37
End: 2022-06-01
Payer: MEDICAID

## 2022-06-01 NOTE — TELEPHONE ENCOUNTER
Sent message to sender. Not taking any medicaid patients at this time.   ----- Message from Mary Alice Russo sent at 6/1/2022  8:08 AM CDT -----  Regarding: FW: Pulmonary Referral  Good Morning,    I was checking the status of getting this patient scheduled.    Thank you,  Mary Alice    ----- Message -----  From: Mary Alice Russo  Sent: 5/13/2022  10:24 AM CDT  To: [facility] Pulmonary Clinical Support  Subject: Pulmonary Referral                               Good Morning,    Dr. Helen Angel would like to refer the following patient to the Pulmonary department. The patients diagnosis is moderate persistent asthma. I have scanned the patients referral and records into .   .   Please let me know if I can help schedule in any way.    Thank you,   Mary Alice Garcia

## 2022-07-19 DIAGNOSIS — E88.01 ALPHA-1-ANTITRYPSIN DEFICIENCY: Primary | ICD-10-CM

## 2022-08-23 ENCOUNTER — HOSPITAL ENCOUNTER (OUTPATIENT)
Dept: PULMONOLOGY | Facility: HOSPITAL | Age: 37
Discharge: HOME | End: 2022-08-23
Attending: INTERNAL MEDICINE
Payer: MEDICAID

## 2022-08-23 DIAGNOSIS — E88.01 ALPHA-1-ANTITRYPSIN DEFICIENCY: ICD-10-CM

## 2022-08-23 PROCEDURE — 94060 EVALUATION OF WHEEZING: CPT

## 2022-08-23 PROCEDURE — 94727 GAS DIL/WSHOT DETER LNG VOL: CPT

## 2022-08-23 PROCEDURE — 94010 BREATHING CAPACITY TEST: CPT

## 2022-08-23 PROCEDURE — 94729 DIFFUSING CAPACITY: CPT

## 2022-12-06 DIAGNOSIS — M25.562 PAIN IN LEFT KNEE: Primary | ICD-10-CM

## 2022-12-12 ENCOUNTER — CLINICAL SUPPORT (OUTPATIENT)
Dept: REHABILITATION | Facility: HOSPITAL | Age: 37
End: 2022-12-12
Payer: MEDICAID

## 2022-12-12 DIAGNOSIS — M62.81 MUSCLE WEAKNESS: ICD-10-CM

## 2022-12-12 DIAGNOSIS — M25.562 CHRONIC PAIN OF LEFT KNEE: Primary | ICD-10-CM

## 2022-12-12 DIAGNOSIS — M25.60 DECREASED RANGE OF MOTION: ICD-10-CM

## 2022-12-12 DIAGNOSIS — G89.29 CHRONIC PAIN OF LEFT KNEE: Primary | ICD-10-CM

## 2022-12-12 PROCEDURE — 97162 PT EVAL MOD COMPLEX 30 MIN: CPT | Mod: PN | Performed by: PHYSICAL THERAPIST

## 2022-12-12 PROCEDURE — 97110 THERAPEUTIC EXERCISES: CPT | Mod: PN | Performed by: PHYSICAL THERAPIST

## 2022-12-12 NOTE — PLAN OF CARE
[facility]  Physical Therapy Initial Evaluation    Name: Christine Segovia  Clinic Number: [id number]    Therapy Diagnosis:   Encounter Diagnoses   Name Primary?    Chronic pain of left knee Yes    Muscle weakness     Decreased range of motion      Physician: Helen Carpenter MD    Physician Orders: PT Eval and Treat   Medical Diagnosis from Referral: M25.562 (ICD-10-CM) - Pain in left knee  Evaluation Date: 12/12/2022  Authorization Period Expiration: 12-31-22  Plan of Care Expiration: 2-6-23  Progress Note Due: 1-12-23  Visit # / Visits authorized: 1/ 1  FOTO: Issued Visit #: 1    MD Follow up appointment: none scheduled    Precautions: hx of CAD and COPD    Time In: 12:05  Time Out: 12:45  Total Billable Time: 40 minutes    Subjective   Date of onset: 4 weeks ago  History of current condition - Christine reports: was on her feet with boots for a long time at [facility] and began with pain in L knee. Pt states she had difficulty bending the L knee.  Pt states no swelling.  Pt went to [facility] and knee is improving somewhat Pt states knee was improving when went to [facility] so no x-rays and no med     Medical History:   Pt also has Factor 5 deficiency and needs to wear compression stockings   Past Medical History:   Diagnosis Date    COPD (chronic obstructive pulmonary disease)     Coronary artery disease        Surgical History:  2 C sections and 3 D and C .  Pt has 2 stents for heart   Christine Segovia  has no past surgical history on file.    Medications:   Christine has a current medication list which includes the following prescription(s): albuterol, atorvastatin, clopidogrel, tiotropium, varenicline, and warfarin.    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      Pts goals: to have no pain    Objective     Postural examination in standing:  - forward head  - forward shoulders  - R hip high  -L shoulder high    Postural examination in sitting:   - forward head  - forward shoulders  - knee positioned slightly extended      Functional assessment: no deficits  - walking/gait:No gait deficits were noted.  Patient able to toe and heel walk.    - sit to stand:   - sit to supine:        - supine to sit:   - supine to prone:      Knee Girth: no swelling noted      Knee  ROM: (measured in degrees)   Knee (R) (L)   Flexion 130 130   Extension 0 0         Flexibility testing:  - hamstrings:     90/90 test R 10 L 5           - gastrocnemius:   DF ankle R 5 degrees L 5 degrees  - Hip ER 20 L 30 R       Muscle Strength  MMT R L   Hip flexion 5/5 4/5   Hip abduction 5/5 4/5   Hip extension 4+/5 4+/5   Glut max 4/5 4/5   Hip adduction 5-/5 4/5   Knee extension 5/5 5-/5   Knee flexion 5/5 5-/5   Ankle dorsiflexion 5/5 5/5   Ankle plantar flexion 5/5 5/5   Ankle eversion 5/5 5/5     Endurance is 5excellent / good / fair / poor.    Special tests: + Figure 4 with ER stretch    Palpation: no significant TTP     Joint mobility: WNL    Sensation: Intact      CMS Impairment/Limitation/Restriction for FOTO knee Survey    Therapist reviewed FOTO scores for Christine Segovia on 12/12/2022.   FOTO documents entered into [employer] - see Media section.    Limitation Score: 38%       TREATMENT   Treatment Time In:  12:30  Treatment Time Out: 12:45  Total Treatment time separate from Evaluation: 15 minutes    Christine received therapeutic exercises to develop strength, endurance, flexibility, and core stabilization for 15 minutes including:  SLR x 10  Hip abd SL x 10  Hip add SL x 10 top leg behind bottom leg  Hip ext prone x 10 Instructed pt to perform B   Figure 4 ER stretch pushing knee down away from body x 10    Instructed pt to ice as needed to address any soreness which may occur.        Home Exercises and Patient Education Provided    Education provided:   - HEP   - stretch to point of tightness not pain   - exercise in pain free zone     Written Home Exercises Provided: Yes   Exercises were reviewed and Christine was able to demonstrate them prior to the end of the session.  Christine demonstrated good  understanding of the education provided.     See EMR under Patient Instructions for exercises provided 12/12/2022.    Assessment   Christine is a 37 y.o. female referred to outpatient Physical Therapy with a medical diagnosis of M25.562 (ICD-10-CM) - Pain in left knee. Pt presents with L knee pain with CC of difficulty crossing leg and exhibited decreased ER.  Pt also noted to have decreased strength    Pt prognosis is Good.   Pt will benefit from skilled outpatient Physical Therapy to address the deficits stated above and in the chart below, provide pt/family education, and to maximize pt's level of independence.     Plan of care discussed with patient: Yes  Pt's spiritual, cultural and educational needs considered and patient is agreeable to the plan of care and goals as stated below:     Anticipated Barriers for therapy: none    Medical Necessity is demonstrated by the following  History  Co-morbidities and personal factors that may impact the plan of care Co-morbidities:   CAD    Personal Factors:   no deficits     moderate   Examination  Body Structures and Functions, activity limitations and participation restrictions that  may impact the plan of care Body Regions:   lower extremities  trunk    Body Systems:    strength  balance  gait    Participation Restrictions:   ADL    Activity limitations:   Learning and applying knowledge  no deficits    General Tasks and Commands  no deficits    Communication  no deficits    Mobility  walking    Self care  dressing    Domestic Life  shopping  cooking  doing house work (cleaning house, washing dishes, laundry)  assisting others    Interactions/Relationships  no deficits    Life Areas  no deficits    Community and Social Life  no deficits         moderate   Clinical Presentation evolving clinical presentation with changing clinical characteristics moderate   Decision Making/ Complexity Score: moderate     GOALS:   Short Term Goals:  4 weeks  Increase range of motion 25%  Increase strength 1/2 muscle grade  Be able to perform HEP with minimal cueing required    Long Term Goals: 8 weeks  Increase range of motion to 75% to 100% full   Improve muscle strength 1 muscle grade  Restore ability to ambulate with normal pain free gait  Walking for ADL and exercise will be restored without increased pain  Restore ability to stand for ADL without increased pain  Restore ability to don shoes and socks and pants without increased pain  Restore ability to participate in an exercise program for Wellness   Restore ability to perform ADL's and household activities independently and without increased pain    Plan   Plan of care Certification: 12/12/2022 to 2-6-23.    Outpatient Physical Therapy 2 times weekly for 8 weeks to include the following interventions: Therapeutic exercises, manual therapy techniques neuromuscular re-education, therapeutic activities, modalities such as moist heat, ice, ultrasound and electrical stimulation, dry needling, and temporary orthotics will be considered and utilized as needed.      Ethel Samuel, PT      I certify the need for these services furnished under this plan of  treatment and while under my care.    ____________________________________ Physician/Referring Practitioner                                  Date of Signature

## 2022-12-30 ENCOUNTER — DOCUMENTATION ONLY (OUTPATIENT)
Dept: REHABILITATION | Facility: HOSPITAL | Age: 37
End: 2022-12-30

## 2022-12-30 DIAGNOSIS — M25.60 DECREASED RANGE OF MOTION: ICD-10-CM

## 2022-12-30 DIAGNOSIS — M62.81 MUSCLE WEAKNESS: ICD-10-CM

## 2022-12-30 DIAGNOSIS — G89.29 CHRONIC PAIN OF LEFT KNEE: Primary | ICD-10-CM

## 2022-12-30 DIAGNOSIS — M25.562 CHRONIC PAIN OF LEFT KNEE: Primary | ICD-10-CM

## 2022-12-30 NOTE — PROGRESS NOTES
PHYSICAL THERAPY DISCHARGE SUMMARY    Name: Christine Segovia  Referring Provider: Helen Carpenter MD  PT Order: PT evaluate and treat   Clinical #: [id number]  Discharge Summary Date: 12/30/2022  Diagnosis:   1. Chronic pain of left knee        2. Muscle weakness        3. Decreased range of motion            Patient was seen for 1 OP PT visit on 12-12-22.  Pt cancelled/no show visit 4 scheduled sessions. Treatment included: evaluation, HEP, pt education, ther ex. Pt was called on this date after not showing for her visit.  Pt reported she had cancelled on line and did not need to return to PT due to her knee doing well.  PT unable to fully assess goal achievement as pt did not return for follow up sessions/did not reschedule follow up visits. This patient is discharged from OP PT Services.

## 2024-05-24 DIAGNOSIS — I87.2 VENOUS INSUFFICIENCY: Primary | ICD-10-CM

## 2024-06-06 ENCOUNTER — TELEPHONE (OUTPATIENT)
Dept: VASCULAR SURGERY | Facility: CLINIC | Age: 39
End: 2024-06-06
Payer: MEDICAID

## 2024-06-06 NOTE — TELEPHONE ENCOUNTER
Spoke with the pt and appt scheduled.  ----- Message from Arianna Evans sent at 6/6/2024  9:40 AM CDT -----  Regarding: R/S Appt  Contact: [phone]  CONSULT/ADVISORY    Name of Caller:  ABDIEL DUARTE [[id number]]    Contact Preference:   [phone]    Nature of Call:  Pt is calling to r/s appt for 06/07/2024.  Please call.

## 2024-06-20 ENCOUNTER — HOSPITAL ENCOUNTER (OUTPATIENT)
Dept: VASCULAR SURGERY | Facility: CLINIC | Age: 39
Discharge: HOME | End: 2024-06-20
Attending: SURGERY
Payer: MEDICAID

## 2024-06-20 DIAGNOSIS — I87.2 VENOUS INSUFFICIENCY: ICD-10-CM

## 2024-06-20 PROCEDURE — 93970 EXTREMITY STUDY: CPT | Mod: 26,S$PBB,, | Performed by: SURGERY

## 2024-06-20 PROCEDURE — 93970 EXTREMITY STUDY: CPT | Mod: PBBFAC | Performed by: SURGERY

## 2024-09-25 DIAGNOSIS — S66.912A STRAIN OF UNSPECIFIED MUSCLE, FASCIA AND TENDON AT WRIST AND HAND LEVEL, LEFT HAND, INITIAL ENCOUNTER: Primary | ICD-10-CM

## 2024-09-27 ENCOUNTER — DOCUMENTATION ONLY (OUTPATIENT)
Dept: REHABILITATION | Facility: HOSPITAL | Age: 39
End: 2024-09-27

## 2024-09-27 ENCOUNTER — TELEPHONE (OUTPATIENT)
Dept: REHABILITATION | Facility: HOSPITAL | Age: 39
End: 2024-09-27

## 2024-09-27 NOTE — PROGRESS NOTES
Sent teams message at 07:49 to  to call and reschedule patient since they not showed today's 07:00 OT evaluation    Told them to tell patient I am working on a discrepancy on order which needs to be fixed prior to me seeing patient    Told desk to schedule patient week after next to allow for time to get issue with order resolved

## 2024-10-08 ENCOUNTER — TELEPHONE (OUTPATIENT)
Dept: REHABILITATION | Facility: HOSPITAL | Age: 39
End: 2024-10-08

## 2024-10-08 ENCOUNTER — DOCUMENTATION ONLY (OUTPATIENT)
Dept: REHABILITATION | Facility: HOSPITAL | Age: 39
End: 2024-10-08

## 2024-10-08 NOTE — PROGRESS NOTES
Spoke with our  to reschedule today's 08:00 visit since no updated order is in media from what I can tell (see phone note). I told  I will reach out to referring provider's office.

## 2024-10-09 DIAGNOSIS — M79.641 RIGHT HAND PAIN: Primary | ICD-10-CM

## 2024-10-17 ENCOUNTER — CLINICAL SUPPORT (OUTPATIENT)
Dept: REHABILITATION | Facility: HOSPITAL | Age: 39
End: 2024-10-17
Payer: MEDICAID

## 2024-10-17 ENCOUNTER — DOCUMENTATION ONLY (OUTPATIENT)
Dept: REHABILITATION | Facility: HOSPITAL | Age: 39
End: 2024-10-17

## 2024-10-17 DIAGNOSIS — M25.60 STIFFNESS IN JOINT: ICD-10-CM

## 2024-10-17 DIAGNOSIS — M79.642 PAIN IN LEFT HAND: ICD-10-CM

## 2024-10-17 DIAGNOSIS — M79.641 PAIN IN RIGHT HAND: Primary | ICD-10-CM

## 2024-10-17 DIAGNOSIS — R52 PAIN: ICD-10-CM

## 2024-10-17 PROCEDURE — 97165 OT EVAL LOW COMPLEX 30 MIN: CPT | Mod: PN

## 2024-10-17 PROCEDURE — 97530 THERAPEUTIC ACTIVITIES: CPT | Mod: PN

## 2024-10-17 NOTE — PLAN OF CARE
[facility] Occupational Therapy  Initial Evaluation     Date: 10/17/2024  Name: Christine Segovia  Clinic Number: [id number]    Therapy Diagnosis:   Encounter Diagnoses   Name Primary?    Pain in right hand Yes    Pain in left hand     Pain     Stiffness in joint      Physician: Helen Carpenter MD    Physician Orders: evaluate and tx, see epic  Medical Diagnosis: bilateral hand pain  Surgical Procedure and Date: n/a, n/a      See surgical report for details if applicable      Evaluation Date: 10/17/2024  Insurance Authorization Period Expiration: referral [id number] 10-9-24 thru 12-31-24              Plan of Care Certification Period: 10-17-24 thru 1-9-25                            Date of Return to referral source's office: will get from patient    Visit # / Visits authorized: 1 / 1 referral [id number] 10-9-24 thru 12-31-24  Time In:7  Time Out: 730  Total Billable Time: 15 minutes    Precautions:  universal, see epic, protocol if applicable  Subjective       Worker's compensation case?   no    Involved Side: order says both but patient has no pain or issues she is concerned about on right   Dominant Side: right  Date of Onset: about 1 month ago  History of Current Condition/Mechanism of Injury: report pain onset associated with decorating cakes, saw referring provider (no scans done-see media info in epic), therapy ordered  Imaging: see epic  Previous Therapy: none for above dx    Past Medical History/Physical Systems Review:   Christine Segovia  has a past medical history of COPD (chronic obstructive pulmonary disease) and Coronary artery disease.    Christine Segovia  has no past surgical history on file.    Christine has a current medication list which includes the following prescription(s): albuterol, atorvastatin, clopidogrel, tiotropium, varenicline, and warfarin.    Review of patient's allergies indicates:  No Known Allergies     Any past medical hx on right arm? no    Any past medical hx on left arm?  no    Any therapy in past for diagnosis sent here for? no    Patient's Goals for Therapy: full painless use    Pain:  Functional Pain Scale Rating 0-10:   3/10 on average  0/10 at best  6/10 at worst  Side:left  Location: RF A1 pulley area  Description: sharp  Aggravating Factors: some use  Easing Factors: rest  Occupation:  cake artist, [employer]   Working presently: yes  Duties: per job if working; typical self and home care    Functional Limitations/Social History:    Previous functional status includes: Independent with all ADLs.     Current Functional Status   Home/Living environment : lives with boyfriend, 2 kids    Limitation of Functional Status as follows: decreased motion, use, and strength with ADL   ADLs/IADLs:               See above   Leisure: not tested  pain meds: denies  nicotine: denies  caffeine: 1.5 cups tea daily  alcohol:not asked  blood thinners:not asked  diabetes: not asked    Objective   Posture: wnl                                              Palpation:sore over left RF A1 pulley area  Sensation:intermittent tingling left dorsal radial hand which runs diffusely towards shoulder on dorsum of arm  ROM:     Full prom wrist and all digit except intrinsic stretch 1 cm to A1 pulley on left vs 0 on right          Edema: none noted  DME:says has what sounds like a long thumb spica for right hand for an old issue          CMS Impairment/Limitation/Restriction for FOTO Survey    Therapist reviewed FOTO scores for Christine Segovia on 10/17/2024.   FOTO documents entered into [employer] - see Media section.                 Treatment     Treatment Time In: 715  Treatment Time Out: 730  Total Treatment time separate from Evaluation time:15    Christine received therapeutic exercises for 15 minutes including:    Left RF intrinsic stretch by me x 7.5 min.  Left RF intrinsic stretch by patient x 7.5 min.                    Home Exercise Program/Education:  Issued HEP (see patient instructions in EMR in media or note)  . Exercises were reviewed and Christine was able to demonstrate them prior to the end of the session.   Pt received a written copy of exercises to perform at home. Christine demonstrated good understanding of the education provided.  Pt was advised to perform these exercises free of pain, and to stop performing them if pain occurs.    Patient/Family Education: role of OT, goals for OT, scheduling/cancellations - pt verbalized understanding. Discussed insurance limitations with patient.    Additional Education provided: likely tx progression, expectations of rehab    Assessment     Chirstine Segovia is a 39 y.o. female referred to outpatient occupational therapy and presents with a medical diagnosis of see above resulting in Decreased ROM, Decreased functional hand use, Edema, Joint Stiffness, and Diminished/Impaired Sensation and demonstrates limitations as described in the chart below. Following medical record review it is determined that pt will benefit from occupational therapy services in order to maximize pain free and/or functional use of left hand. The following goals were discussed with the patient and patient is in agreement with them as to be addressed in the treatment plan. The patient's rehab potential is good.     Anticipated barriers to occupational therapy: pain, stiffness  Pt has no cultural, educational or language barriers to learning provided.    Profile and History Assessment of Occupational Performance Level of Clinical Decision Making Complexity Score   Occupational Profile:   Christine Segovia is a 39 y.o. female who lives with their family and is currently employed Christine Segovia has difficulty with  ADLs and IADLs as listed previously, which  affecting his/her daily functional abilities.      Comorbidities:    has a past medical history of COPD (chronic obstructive pulmonary disease) and Coronary artery disease.    Medical and Therapy History Review:   Brief               Performance Deficits    Physical:  Joint  Mobility  Pain    Cognitive:  No Deficits    Psychosocial:    No Deficits     Clinical Decision Making:  low    Assessment Process:  Problem-Focused Assessments    Modification/Need for Assistance:  Not Necessary    Intervention Selection:  Limited Treatment Options       low  Based on PMHX, co morbidities , data from assessments and functional level of assistance required with task and clinical presentation directly impacting function.           The following goals were discussed with the patient and patient is in agreement with them as to be addressed in the treatment plan.     Goals:   Short Term Goals (STG) # weeks Goal Review Date Reviewed Date Met   Patient will be I with HEP 4 Initial today    2.   Patient will have 2/10 pain with light use 4 Initial today    3.   Patient will have = prom of left RF vs unaffected side to increase use with ADL 4 Initial today                                                       Long Term Goals (LTG) by d/c Goal Review Date Reviewed Date Met   Patient will be I with d/c HEP  Initial today    2.   Patient will have  1/10 pain with light use  Initial today      Initial today    3.   Patient will be I with all required light ADL  initial today                                                All goals ongoing unless noted above or met today or in past but not noted yet        Plan   Certification Period/Plan of care expiration: 10/17/2024 to             1-9-25    Outpatient Occupational Therapy  2 times weekly for 12 weeks to include the following interventions: eval and tx    Above frequency and duration in above dates may change based on patient progress and need for therapy    Chace TORRES CHT    I certify the need for the services furnished under this plan of care.    doctor's signature/referring provider's signature:______________________________________________________

## 2024-10-17 NOTE — PATIENT INSTRUCTIONS
home program  10-17-24  -minimize repetitive use as much as possible, avoid sustained  as much as possible  -avoid painful use  -do below stretch one time, at least 2 x day, at least a 10 minute hold, mild discomfort only; do on left ring finger

## 2024-10-22 ENCOUNTER — DOCUMENTATION ONLY (OUTPATIENT)
Dept: REHABILITATION | Facility: HOSPITAL | Age: 39
End: 2024-10-22
Payer: MEDICAID

## 2024-10-22 NOTE — PROGRESS NOTES
Documentation only visit    Power was off in clinic so fluidotherapy and u/s not able to be done     Fludiotherapy and u/s was part of the plan for today (explained to patient)     Reviewed current home stretch    Explained to patient to continue current home program, briefly reviewed home program stretch

## 2024-10-24 ENCOUNTER — CLINICAL SUPPORT (OUTPATIENT)
Dept: REHABILITATION | Facility: HOSPITAL | Age: 39
End: 2024-10-24
Payer: MEDICAID

## 2024-10-24 ENCOUNTER — DOCUMENTATION ONLY (OUTPATIENT)
Dept: REHABILITATION | Facility: HOSPITAL | Age: 39
End: 2024-10-24
Payer: MEDICAID

## 2024-10-24 DIAGNOSIS — M25.60 STIFFNESS IN JOINT: ICD-10-CM

## 2024-10-24 DIAGNOSIS — R52 PAIN: Primary | ICD-10-CM

## 2024-10-24 PROCEDURE — 97530 THERAPEUTIC ACTIVITIES: CPT | Mod: PN

## 2024-10-24 NOTE — PROGRESS NOTES
Occupational Therapy Daily Treatment Note     Date: 10/24/2024  Name: Christine Segovia  Clinic Number: [id number]    Therapy Diagnosis:   Encounter Diagnoses   Name Primary?    Pain Yes    Stiffness in joint      Physician: Helen Carpenter MD      Physician Orders: evaluate and tx, see epic  Medical Diagnosis: bilateral hand pain  Surgical Procedure and Date: n/a, n/a        See surgical report for details if applicable        Evaluation Date: 10/17/2024  Insurance Authorization Period Expiration: referral [id number] 9-26-24 thru 12-31-24             Plan of Care Certification Period: 10-17-24 thru 1-9-25                            Date of Return to referral source's office: will get from patient     Visit # / Visits authorized: 1 / 20 referral [id number] 9-26-24 thru 12-31-24    Time In:7  Time Out: 745  Total Billable Time: 45 minutes    Precautions:  universal, see epic, protocol if applicable    Subjective     Pt reports: an understanding to get a pad for steering wheel  she is compliant with home exercise program.  Response to previous treatment:good  Functional change: none stated    Pain: 0/10 rest; 4/10 light use  Side:left  Location: RF      A1 pulley area  Quality: sore          duration-intermittent  Objective      Objective Measures updated at progress report unless specified.     Treatment     Christine received the treatments listed below:      Time 715-730  therapeutic exercises to develop strength and ROM if applicable for 15 minutes including:  Left RF intrinsic stretch    Time:730-715  manual therapy techniques: transverse friction massage  to the: left RF A1 pulley area for 15 minutes    Time:7-715  supervised modalities after being cleared for contradictions: Fluido      Home Exercises and Education Provided     Education provided:     See above    Explained basic concept of creep, tissue reorientation, collagen reorganization, etc. associated with low load prolonged stretching as well as  counterproductive consequences of painful and forceful stretching (increased edema, tissue trauma, ligamentous shortening, etc.)          progress towards goals   likely treatment progression  rationale of rehab interventions    Written Home Exercises/Information Provided: yes.        previously issued exercises and/or other issued home therapy instructions were reviewed if still part of current treatment plan as well as any issued today and Christine was able to demonstrate them prior to the end of the session.  Christine demonstrated good understanding of the HEP provided.     See EMR under patient instructions and/or media for HEP issued today or in past    Assessment       Resolving any contractile and noncontractile tightness in left RF imperative for proper mechanics    Pulsed u/s may help to decrease any inflammation and pain along left RF A1 area however transverse friction massage will be done instead today and the plan is to hold off on u/s for now      Pt would continue to benefit from skilled occupational therapy in order to facilitate strong, painless, and full use.    Christine is progressing well towards her goals and there are no updates to goals at this time unless goals noted below are different than goals on previous notes or evaluation. Pt prognosis is good  Pt will continue to benefit from skilled outpatient occupational therapy to address the deficits listed in the problem list on initial evaluation to provide pt/family education and to maximize pt's level of independence in the home and community environment.     Anticipated barriers to occupational therapy: pain, stiffness    Pt's spiritual, cultural and educational needs considered and pt agreeable to plan of care and goals.    Goals:  The following goals were discussed with the patient and patient is in agreement with them as to be addressed in the treatment plan.      Goals:   Short Term Goals (STG) # weeks Goal Review Date Reviewed Date Met    Patient will be I with HEP 4 Initial today     2.   Patient will have 2/10 pain with light use 4 Initial today     3.   Patient will have = prom of left RF vs unaffected side to increase use with ADL 4 Initial today                                                                                            Long Term Goals (LTG) by d/c Goal Review Date Reviewed Date Met   Patient will be I with d/c HEP   Initial today     2.   Patient will have  1/10 pain with light use   Initial today         Initial today     3.   Patient will be I with all required light ADL   initial today                                                                                All goals ongoing unless noted above or met today or in past but not noted yet                  Any goals met today ?  (if any met see above)    Updates/Grading for next session: as needed    Plan     Evaluate and tx    Chace TORRES CHT

## 2024-11-07 ENCOUNTER — DOCUMENTATION ONLY (OUTPATIENT)
Dept: REHABILITATION | Facility: HOSPITAL | Age: 39
End: 2024-11-07
Payer: MEDICAID

## 2024-11-07 NOTE — PROGRESS NOTES
Outpatient Therapy Discharge Summary     Date: 11-7-24      Name: Christine Segovia  [facility] Number: [id number]    Therapy Diagnosis: No diagnosis found.  Physician: No ref. provider found    Physician Orders: see evaluation  Medical Diagnosis: see evaluation  Evaluation Date: 10-17-24      Date of Last visit: 10-24-24  Total Visits Received: 2  Cancelled Visits: see epic  No Show Visits: see epic    Assessment    Goals: see last note    Discharge reason: Patient has not attended therapy since 10-24-24    Plan   This patient is discharged from Occupational Therapy

## 2025-05-16 ENCOUNTER — HOSPITAL ENCOUNTER (OUTPATIENT)
Dept: RADIOLOGY | Facility: HOSPITAL | Age: 40
Discharge: HOME | End: 2025-05-16
Attending: INTERNAL MEDICINE
Payer: MEDICAID

## 2025-05-16 DIAGNOSIS — R10.9 UNSPECIFIED ABDOMINAL PAIN: ICD-10-CM

## 2025-05-16 PROCEDURE — 76700 US EXAM ABDOM COMPLETE: CPT | Mod: 26,,, | Performed by: RADIOLOGY

## 2025-05-16 PROCEDURE — 76700 US EXAM ABDOM COMPLETE: CPT | Mod: TC,PO
